# Patient Record
Sex: FEMALE | Race: WHITE | NOT HISPANIC OR LATINO | Employment: OTHER | URBAN - METROPOLITAN AREA
[De-identification: names, ages, dates, MRNs, and addresses within clinical notes are randomized per-mention and may not be internally consistent; named-entity substitution may affect disease eponyms.]

---

## 2017-06-06 ENCOUNTER — TELEPHONE (OUTPATIENT)
Dept: ENDOSCOPY | Facility: HOSPITAL | Age: 57
End: 2017-06-06

## 2017-06-06 ENCOUNTER — OFFICE VISIT (OUTPATIENT)
Dept: SURGERY | Facility: CLINIC | Age: 57
End: 2017-06-06
Payer: MEDICARE

## 2017-06-06 VITALS
SYSTOLIC BLOOD PRESSURE: 140 MMHG | DIASTOLIC BLOOD PRESSURE: 92 MMHG | BODY MASS INDEX: 33.19 KG/M2 | HEIGHT: 70 IN | WEIGHT: 231.81 LBS | TEMPERATURE: 98 F | HEART RATE: 98 BPM

## 2017-06-06 DIAGNOSIS — K31.84 GASTROPARESIS: Primary | ICD-10-CM

## 2017-06-06 PROCEDURE — 99024 POSTOP FOLLOW-UP VISIT: CPT | Mod: S$GLB,,, | Performed by: SURGERY

## 2017-06-06 PROCEDURE — 99999 PR PBB SHADOW E&M-EST. PATIENT-LVL III: CPT | Mod: PBBFAC,,, | Performed by: SURGERY

## 2017-06-06 RX ORDER — OXYCODONE AND ACETAMINOPHEN 10; 325 MG/1; MG/1
10-325 TABLET ORAL EVERY 4 HOURS PRN
COMMUNITY

## 2017-06-06 RX ORDER — TOPIRAMATE 25 MG/1
25 TABLET ORAL EVERY MORNING
COMMUNITY

## 2017-06-06 NOTE — LETTER
[facility] - General Surgery  [address]  [facility] [address]  Phone: [phone] June 6, 2017      Denise Leavitt MD  [address] [facility] [place] [address]    Patient: Jagruti Sands   MR Number: [id number]   YOB: 1960   Date of Visit: 6/6/2017     Dear Dr. Leavitt:    Thank you for referring Jagruti Sands to me for evaluation. Below are the relevant portions of my assessment and plan of care.    Patient presents with worsening gastroparesis symptoms, but battery seems ok. Adjusted today.     PLAN:  Will get CT result and ERCP report.  Obtain EGD.  Will follow up with Dr. Valenzuela for further adjustments.     If you have questions, please do not hesitate to call me. I look forward to following Jagruti along with you.    Sincerely,      Guillermo Esteves MD   Section Head - General, Laparoscopic, Bariatric  Acute Care and Oncologic Surgery   - Surgical Weight Loss Program  [facility]    WSRANDELL/chirag

## 2017-06-06 NOTE — PROGRESS NOTES
History & Physical    SUBJECTIVE:     History of Present Illness:  Patient is a 57 y.o. female presents with gastroparesis.  She has a gastric neurostimulator (she has had 6 procedure, sometimes battery replacement only) and had her last procedure here for that in 2015.  She was doing well until May when she was admitted for 10 days.  She has pain and nausea that is unremitting.  Usually when she has a flare of her symptoms they don't last that long.  She has 8/10 upper abdominal pain, nausea and vomiting that worsens with eating.  She has had an ercp for cbd stones and sphincterotomy in January.  She has not had a recent egd but has a ct from January.  She has zofran and phenergan but does poorly with these meds.      Chief Complaint   Patient presents with    Follow-up       Review of patient's allergies indicates:   Allergen Reactions    Amoxicillin Nausea And Vomiting    Bactrim [sulfamethoxazole-trimethoprim] Swelling     Patient states tongue swelling    Compazine [prochlorperazine] Anxiety    Hydrocodone Rash    Reglan [metoclopramide] Anaphylaxis    Adhesive tape-silicones Hives    Imitrex [sumatriptan] Palpitations    Adhesive     Aspirin      Causes bruising    Toradol [ketorolac] Rash       Current Outpatient Prescriptions   Medication Sig Dispense Refill    amitriptyline (ELAVIL) 150 MG Tab 100 mg nightly.       busPIRone (BUSPAR) 15 MG tablet 15 mg once. Take 2 pills once daily      butalbital-acetaminophen-caffeine -40 mg (FIORICET, ESGIC) -40 mg per tablet 2 tablets every 4 (four) hours as needed.       levothyroxine (SYNTHROID) 150 MCG tablet 150 mcg before breakfast.       liothyronine (CYTOMEL) 5 MCG Tab 5 mcg once daily.       lorazepam (ATIVAN) 1 MG tablet 1 mg every 8 (eight) hours as needed.       meclizine (ANTIVERT) 25 mg tablet 25 mg as needed.       oxycodone-acetaminophen (PERCOCET)  mg per tablet Take  tablets by mouth every 4 (four) hours as  needed for Pain.      pantoprazole (PROTONIX) 40 MG tablet 40 mg once daily.       tizanidine (ZANAFLEX) 4 MG tablet 4 mg. Take 8mg at night and PRN 3 times a day      topiramate (TOPAMAX) 25 MG tablet Take 25 mg by mouth every morning.      zolmitriptan (ZOMIG) 5 MG tablet 5 mg as needed.        No current facility-administered medications for this visit.        Past Medical History:   Diagnosis Date    Anemia     Anxiety     Depression     Fibromyalgia     Gastroparesis     GERD (gastroesophageal reflux disease)     Hemorrhoids     Hypothyroid     IBS (irritable bowel syndrome)     Migraine     PUD (peptic ulcer disease)      Past Surgical History:   Procedure Laterality Date    bladder lift N/A 04/2014    CHOLECYSTECTOMY      gastric neurostimulator      gastric pacemaker      HYSTERECTOMY       Family History   Problem Relation Age of Onset    Cancer Mother     Alcohol abuse Mother     Stroke Mother      Social History   Substance Use Topics    Smoking status: Never Smoker    Smokeless tobacco: Not on file    Alcohol use No        Review of Systems:  Review of Systems   Constitutional: Positive for unexpected weight change. Negative for fever.        Has lost weight on topomax and with nausea   Respiratory: Negative for cough, chest tightness and shortness of breath.         Is very weak due to this illness   Cardiovascular: Negative for chest pain.   Gastrointestinal: Negative for constipation and diarrhea.        Has bms several times a day but states that imaging shows she is fos   Genitourinary: Positive for dysuria and frequency. Negative for difficulty urinating.   Skin: Negative for rash and wound.   Neurological: Positive for headaches. Negative for seizures.        Has migraines 2-3 times a week   Hematological: Does not bruise/bleed easily.       OBJECTIVE:     Vital Signs (Most Recent)  Temp: 98.4 °F (36.9 °C) (06/06/17 0858)  Pulse: 98 (06/06/17 0858)  BP: (!) 140/92  "(06/06/17 0858)  5' 10" (1.778 m)  105.2 kg (231 lb 13 oz)     Physical Exam:  Physical Exam   Constitutional: She is oriented to person, place, and time. She appears well-developed and well-nourished.   Neck: Normal range of motion. Neck supple.   Cardiovascular: Normal rate, regular rhythm and normal heart sounds.    Pulmonary/Chest: Effort normal and breath sounds normal.   Abdominal: Soft. Bowel sounds are normal. She exhibits no distension.   Neurological: She is alert and oriented to person, place, and time.   Skin: Skin is warm and dry.        Psychiatric: She has a normal mood and affect. Her behavior is normal. Judgment and thought content normal.   Vitals reviewed.    Stimulator adjustment: imp 641 volt 5.1 curr 8 pw 330 rate 28 on 2 off 3 (from rate 14 on 1 off 4).  She gets shocking so we won't increase voltage.    Laboratory  None recent    Diagnostic Results:  None recent    ASSESSMENT/PLAN:     Worsening gastroparesis symptoms but battery seems ok.  Adjusted today.    PLAN:Plan     Will get ct result and ercp report.  Obtain egd.  Will follow up with Dr. Vlaenzuela for further adjustments.       "

## 2017-06-22 ENCOUNTER — TELEPHONE (OUTPATIENT)
Dept: ENDOSCOPY | Facility: HOSPITAL | Age: 57
End: 2017-06-22

## 2019-08-20 ENCOUNTER — TELEPHONE (OUTPATIENT)
Dept: SURGERY | Facility: CLINIC | Age: 59
End: 2019-08-20

## 2019-08-20 NOTE — TELEPHONE ENCOUNTER
----- Message from Fady Cheema sent at 8/19/2019  5:05 PM CDT -----  Contact: self  Pt would like a callback and would like a sooner appt than the 1 I caroline for her.              Pt callback number [phone]

## 2019-08-29 ENCOUNTER — OFFICE VISIT (OUTPATIENT)
Dept: SURGERY | Facility: CLINIC | Age: 59
End: 2019-08-29
Payer: MEDICARE

## 2019-08-29 VITALS
SYSTOLIC BLOOD PRESSURE: 130 MMHG | WEIGHT: 231 LBS | HEIGHT: 70 IN | DIASTOLIC BLOOD PRESSURE: 82 MMHG | BODY MASS INDEX: 33.07 KG/M2 | HEART RATE: 104 BPM

## 2019-08-29 DIAGNOSIS — K31.84 GASTROPARESIS: Primary | ICD-10-CM

## 2019-08-29 PROCEDURE — 99999 PR PBB SHADOW E&M-EST. PATIENT-LVL IV: CPT | Mod: PBBFAC,,, | Performed by: SURGERY

## 2019-08-29 PROCEDURE — 99214 PR OFFICE/OUTPT VISIT, EST, LEVL IV, 30-39 MIN: ICD-10-PCS | Mod: S$GLB,,, | Performed by: SURGERY

## 2019-08-29 PROCEDURE — 99999 PR PBB SHADOW E&M-EST. PATIENT-LVL IV: ICD-10-PCS | Mod: PBBFAC,,, | Performed by: SURGERY

## 2019-08-29 PROCEDURE — 3008F BODY MASS INDEX DOCD: CPT | Mod: CPTII,S$GLB,, | Performed by: SURGERY

## 2019-08-29 PROCEDURE — 99214 OFFICE O/P EST MOD 30 MIN: CPT | Mod: S$GLB,,, | Performed by: SURGERY

## 2019-08-29 PROCEDURE — 3008F PR BODY MASS INDEX (BMI) DOCUMENTED: ICD-10-PCS | Mod: CPTII,S$GLB,, | Performed by: SURGERY

## 2019-08-29 RX ORDER — MECLIZINE HYDROCHLORIDE 25 MG/1
25 TABLET ORAL
COMMUNITY

## 2019-08-29 RX ORDER — ESOMEPRAZOLE MAGNESIUM 40 MG/1
40 CAPSULE, DELAYED RELEASE ORAL
COMMUNITY

## 2019-08-29 RX ORDER — LORAZEPAM 1 MG/1
TABLET ORAL
COMMUNITY
Start: 2018-06-23

## 2019-08-29 RX ORDER — ISOPROPYL ALCOHOL 0.75 G/1
SWAB TOPICAL
COMMUNITY
Start: 2019-06-11

## 2019-08-29 RX ORDER — OXYCODONE AND ACETAMINOPHEN 10; 325 MG/1; MG/1
1 TABLET ORAL
COMMUNITY

## 2019-08-29 RX ORDER — CEFPODOXIME PROXETIL 100 MG/1
TABLET, FILM COATED ORAL
COMMUNITY
Start: 2019-07-28

## 2019-08-29 RX ORDER — BUTALBITAL, ACETAMINOPHEN, CAFFEINE AND CODEINE PHOSPHATE 50; 325; 40; 30 MG/1; MG/1; MG/1; MG/1
1 CAPSULE ORAL EVERY 4 HOURS PRN
COMMUNITY

## 2019-08-29 RX ORDER — LANOLIN ALCOHOL/MO/W.PET/CERES
100 CREAM (GRAM) TOPICAL
COMMUNITY

## 2019-08-29 RX ORDER — BUSPIRONE HYDROCHLORIDE 15 MG/1
30 TABLET ORAL
COMMUNITY

## 2019-08-29 RX ORDER — FLUCONAZOLE 150 MG/1
TABLET ORAL
COMMUNITY
Start: 2019-06-13

## 2019-08-29 RX ORDER — LIOTHYRONINE SODIUM 5 UG/1
TABLET ORAL
COMMUNITY
Start: 2018-06-26

## 2019-08-29 RX ORDER — CEFDINIR 300 MG/1
CAPSULE ORAL
COMMUNITY
Start: 2019-08-28

## 2019-08-29 RX ORDER — LEVOTHYROXINE SODIUM 50 UG/1
TABLET ORAL
COMMUNITY
Start: 2019-06-12

## 2019-08-29 RX ORDER — AMITRIPTYLINE HYDROCHLORIDE 100 MG/1
TABLET ORAL
COMMUNITY
Start: 2019-06-11

## 2019-08-29 RX ORDER — ERGOCALCIFEROL 1.25 MG/1
50000 CAPSULE ORAL
COMMUNITY

## 2019-08-29 RX ORDER — FOLIC ACID 1 MG/1
2 TABLET ORAL
COMMUNITY

## 2019-08-29 RX ORDER — TIZANIDINE 4 MG/1
8 TABLET ORAL
COMMUNITY

## 2019-08-29 RX ORDER — PANTOPRAZOLE SODIUM 40 MG/1
TABLET, DELAYED RELEASE ORAL
COMMUNITY
Start: 2018-06-23

## 2019-08-29 RX ORDER — DOXYCYCLINE HYCLATE 100 MG
TABLET ORAL
COMMUNITY
Start: 2019-06-26

## 2019-08-29 RX ORDER — LEVOTHYROXINE SODIUM 100 UG/1
TABLET ORAL
COMMUNITY
Start: 2019-08-29

## 2019-08-29 RX ORDER — LEVOTHYROXINE SODIUM 125 UG/1
150 TABLET ORAL
COMMUNITY

## 2019-08-29 NOTE — LETTER
[facility] - General Surgery  [address]  [facility] [address]  Phone: [phone] August 29, 2019      Denise Leavitt MD  [address] [facility] [place] [address]    Patient: Jagruti Lantigua   MR Number: [id number]   YOB: 1960   Date of Visit: 8/29/2019     Dear Dr. Leavitt:    Thank you for referring Jagruti Lantigua to me for evaluation. Attached you will find relevant portions of my assessment and plan of care.    Ms. Lantigua presents with a gastric neurostimulator (she has had 6 procedures, sometimes battery replacement only) and had her last procedure here for that in 2015.  She has shocking in her pocket and says it pokes out more than before. She has never had shocking before.  Also, her gastroparesis symptoms are worse but she has also recently been on a lot of different antibiotics for pneumonia and recurrent UTI.      Gastric stimulator adjustment: imp 611 voltage 4 Current 6.5 Pulse Width 360 Rate 40 Cycle on 3 Cycle off 2.      We will obtain EGD and for pouch revision with replacement of leads if they are broken.    If you have questions, please do not hesitate to call me. I look forward to following Jagruti Lantigua along with you.    Sincerely,      Guillermo Esteves MD  Professor, [employer]  Section Head, General Surgery  [facility]    WSR/afw    CC  Dawson Fontanez MD

## 2019-08-29 NOTE — PROGRESS NOTES
I have seen the patient, reviewed the Resident's history and physical, assessment and plan. I have personally interviewed and examined the patient at bedside and: agree with the findings.     She has a gastric neurostimulator (she has had 6 procedure, sometimes battery replacement only) and had her last procedure here for that in 2015.  She has shocking in her pocket and says it pokes out more than before.  She has never had shocking before.  Also her gastroparesis symptoms are worse but she has also recently been on a lot of different antibiotics for pneumonia and recurrent uti.  Gastric stimulator adjustment: imp 611 voltage 4 Current 6.5 Pulse Width 360 Rate 40 Cycle on 3 Cycle off 2 (was 4.1, 28,2,3) but she continued to have shocking and stimulator turned off..  Obtain egd and for pouch revision with replacement of leads if they are broken.

## 2019-08-29 NOTE — PROGRESS NOTES
History & Physical    SUBJECTIVE:     History of Present Illness:  Patient is a 59 y.o. female that has gastroparesis since 2003. History of 6 gastric stimulators. Last one in 2015 by Dr. Esteves. No one has checked the voltage or the status of the stimulator since the implantation of the device. Gastroparesis is being managed by outsider provider. Since 6 months ago she feels that the symptoms are getting worse, probably related to recurrent UTIs, pancreatitis, and neumonia during that time period. She has moderate vomit, extremely severe nausea, severe satiety, extremely severe bloating, postprandial fullness, epigastric pain, and epigastric burning. Currently taking omperazole one time daily, Prn Zofran (1 pill daily) and 10mg Percocet sporadically for headaches. Since 2-3 weeks ago she feels vibration, pain, and burning in the insertion site of the gastric stimulator. Now in clinic to check the settings of the device.     Chief Complaint   Patient presents with    Follow-up       Review of patient's allergies indicates:   Allergen Reactions    Amoxicillin Nausea And Vomiting    Bactrim [sulfamethoxazole-trimethoprim] Swelling     Patient states tongue swelling    Compazine [prochlorperazine] Anxiety    Hydrocodone Rash    Reglan [metoclopramide] Anaphylaxis    Adhesive tape-silicones Hives    Imitrex [sumatriptan] Palpitations    Adhesive     Zolmitriptan Other (See Comments)     bruises      Aspirin      Causes bruising    Toradol [ketorolac] Rash       Current Outpatient Medications   Medication Sig Dispense Refill    liothyronine (CYTOMEL) 5 MCG Tab       LORazepam (ATIVAN) 1 MG tablet       pantoprazole (PROTONIX) 40 MG tablet       amitriptyline (ELAVIL) 100 MG tablet       amitriptyline (ELAVIL) 150 MG Tab 100 mg nightly.       BD ALCOHOL SWABS PadM       busPIRone (BUSPAR) 15 MG tablet 15 mg once. Take 2 pills once daily      busPIRone (BUSPAR) 15 MG tablet Take 30 mg by mouth.       butalbital-acetaminop-caf-cod -93-30 mg Cap Take 1 capsule by mouth every 4 (four) hours as needed.      butalbital-acetaminophen-caffeine -40 mg (FIORICET, ESGIC) -40 mg per tablet 2 tablets every 4 (four) hours as needed.       cefdinir (OMNICEF) 300 MG capsule       cefpodoxime (VANTIN) 100 MG tablet       cyanocobalamin (VITAMIN B-12) 1000 MCG tablet Take 100 mcg by mouth.      doxycycline (VIBRA-TABS) 100 MG tablet       ergocalciferol (ERGOCALCIFEROL) 50,000 unit Cap Take 50,000 Units by mouth.      esomeprazole (NEXIUM) 40 MG capsule Take 40 mg by mouth.      fluconazole (DIFLUCAN) 150 MG Tab       folic acid (FOLVITE) 1 MG tablet Take 2 mg by mouth.      levothyroxine (SYNTHROID) 100 MCG tablet       levothyroxine (SYNTHROID) 125 MCG tablet Take 150 mcg by mouth.      levothyroxine (SYNTHROID) 150 MCG tablet 150 mcg before breakfast.       levothyroxine (SYNTHROID) 50 MCG tablet       liothyronine (CYTOMEL) 5 MCG Tab 5 mcg once daily.       lorazepam (ATIVAN) 1 MG tablet 1 mg every 8 (eight) hours as needed.       meclizine (ANTIVERT) 25 mg tablet 25 mg as needed.       meclizine (ANTIVERT) 25 mg tablet Take 25 mg by mouth.      oxycodone-acetaminophen (PERCOCET)  mg per tablet Take  tablets by mouth every 4 (four) hours as needed for Pain.      oxyCODONE-acetaminophen (PERCOCET)  mg per tablet Take 1 tablet by mouth.      pantoprazole (PROTONIX) 40 MG tablet 40 mg once daily.       tizanidine (ZANAFLEX) 4 MG tablet 4 mg. Take 8mg at night and PRN 3 times a day      tiZANidine (ZANAFLEX) 4 MG tablet Take 8 mg by mouth.      topiramate (TOPAMAX) 25 MG tablet Take 25 mg by mouth every morning.      zolmitriptan (ZOMIG) 5 MG tablet 5 mg as needed.        No current facility-administered medications for this visit.        Past Medical History:   Diagnosis Date    Anemia     Anxiety     Depression     Fibromyalgia     Gastroparesis     GERD  "(gastroesophageal reflux disease)     Hemorrhoids     Hypothyroid     IBS (irritable bowel syndrome)     Migraine     Pancreatitis 2018    Pneumonia 06/2019    PUD (peptic ulcer disease)      Past Surgical History:   Procedure Laterality Date    bladder lift N/A 04/2014    CHOLECYSTECTOMY      gastric neurostimulator      gastric pacemaker      HYSTERECTOMY      REPLACEMENT GASTRIC NEUROSTIMULATOR POSSIBLE LEAD REPLACEMENT N/A 4/27/2015    Performed by Guillermo Esteves MD at [facility] OR [address] [place] [address]     Family History   Problem Relation Age of Onset    Cancer Mother     Alcohol abuse Mother     Stroke Mother      Social History     Tobacco Use    Smoking status: Never Smoker   Substance Use Topics    Alcohol use: No    Drug use: No        Review of Systems:  Review of Systems   Constitutional: Positive for appetite change and diaphoresis.   HENT: Negative.  Negative for congestion and drooling.    Eyes: Negative.  Negative for pain and discharge.   Respiratory: Negative.  Negative for apnea and chest tightness.    Cardiovascular: Negative.  Negative for chest pain and leg swelling.   Gastrointestinal: Positive for abdominal distention and abdominal pain.   Endocrine: Negative for cold intolerance and heat intolerance.   Genitourinary: Negative.  Negative for difficulty urinating and dysuria.   Musculoskeletal: Negative.  Negative for arthralgias and back pain.   Allergic/Immunologic: Negative.  Negative for environmental allergies and food allergies.   Neurological: Positive for headaches. Negative for dizziness.       OBJECTIVE:     Vital Signs (Most Recent)  Pulse: 104 (08/29/19 1745)  BP: 130/82 (08/29/19 1745)  5' 10" (1.778 m)  104.8 kg (231 lb)     Physical Exam:  Physical Exam   Constitutional: She is oriented to person, place, and time. She appears well-developed and well-nourished.   HENT:   Head: Normocephalic and atraumatic.   Eyes: Pupils are equal, round, and reactive to light. " Conjunctivae are normal.   Neck: Normal range of motion. Neck supple.   Cardiovascular: Normal rate, regular rhythm and normal heart sounds.   Pulmonary/Chest: Effort normal and breath sounds normal.   Abdominal: Soft. Bowel sounds are normal. She exhibits no distension and no mass. There is no tenderness. There is no guarding.   Musculoskeletal: Normal range of motion. She exhibits no edema or deformity.   Neurological: She is alert and oriented to person, place, and time. No cranial nerve deficit.   Skin: Skin is warm.         ERCP:   Done for pancreatitis 6 months ago, showed undigested food in the stomach.     ASSESSMENT/PLAN:     59 y.o female with chronic history of gastroparesis  S/p gastric stimulator placement x6 last one 2015  Now complaining of worsening symptoms and shock pain    PLAN:  - gastric stimulator settings were lower down, however patient could still feal shocking pain so the device was turned off  - Will plan for a pocket revision   - if leads are broken we will replace them  - Plan was discussed with the patient and daughter     Talat Hodges MD   General Surgery PGY-I  Pager [phone]

## 2019-08-29 NOTE — H&P (VIEW-ONLY)
History & Physical    SUBJECTIVE:     History of Present Illness:  Patient is a 59 y.o. female that has gastroparesis since 2003. History of 6 gastric stimulators. Last one in 2015 by Dr. Esteves. No one has checked the voltage or the status of the stimulator since the implantation of the device. Gastroparesis is being managed by outsider provider. Since 6 months ago she feels that the symptoms are getting worse, probably related to recurrent UTIs, pancreatitis, and neumonia during that time period. She has moderate vomit, extremely severe nausea, severe satiety, extremely severe bloating, postprandial fullness, epigastric pain, and epigastric burning. Currently taking omperazole one time daily, Prn Zofran (1 pill daily) and 10mg Percocet sporadically for headaches. Since 2-3 weeks ago she feels vibration, pain, and burning in the insertion site of the gastric stimulator. Now in clinic to check the settings of the device.     Chief Complaint   Patient presents with    Follow-up       Review of patient's allergies indicates:   Allergen Reactions    Amoxicillin Nausea And Vomiting    Bactrim [sulfamethoxazole-trimethoprim] Swelling     Patient states tongue swelling    Compazine [prochlorperazine] Anxiety    Hydrocodone Rash    Reglan [metoclopramide] Anaphylaxis    Adhesive tape-silicones Hives    Imitrex [sumatriptan] Palpitations    Adhesive     Zolmitriptan Other (See Comments)     bruises      Aspirin      Causes bruising    Toradol [ketorolac] Rash       Current Outpatient Medications   Medication Sig Dispense Refill    liothyronine (CYTOMEL) 5 MCG Tab       LORazepam (ATIVAN) 1 MG tablet       pantoprazole (PROTONIX) 40 MG tablet       amitriptyline (ELAVIL) 100 MG tablet       amitriptyline (ELAVIL) 150 MG Tab 100 mg nightly.       BD ALCOHOL SWABS PadM       busPIRone (BUSPAR) 15 MG tablet 15 mg once. Take 2 pills once daily      busPIRone (BUSPAR) 15 MG tablet Take 30 mg by mouth.       butalbital-acetaminop-caf-cod -13-30 mg Cap Take 1 capsule by mouth every 4 (four) hours as needed.      butalbital-acetaminophen-caffeine -40 mg (FIORICET, ESGIC) -40 mg per tablet 2 tablets every 4 (four) hours as needed.       cefdinir (OMNICEF) 300 MG capsule       cefpodoxime (VANTIN) 100 MG tablet       cyanocobalamin (VITAMIN B-12) 1000 MCG tablet Take 100 mcg by mouth.      doxycycline (VIBRA-TABS) 100 MG tablet       ergocalciferol (ERGOCALCIFEROL) 50,000 unit Cap Take 50,000 Units by mouth.      esomeprazole (NEXIUM) 40 MG capsule Take 40 mg by mouth.      fluconazole (DIFLUCAN) 150 MG Tab       folic acid (FOLVITE) 1 MG tablet Take 2 mg by mouth.      levothyroxine (SYNTHROID) 100 MCG tablet       levothyroxine (SYNTHROID) 125 MCG tablet Take 150 mcg by mouth.      levothyroxine (SYNTHROID) 150 MCG tablet 150 mcg before breakfast.       levothyroxine (SYNTHROID) 50 MCG tablet       liothyronine (CYTOMEL) 5 MCG Tab 5 mcg once daily.       lorazepam (ATIVAN) 1 MG tablet 1 mg every 8 (eight) hours as needed.       meclizine (ANTIVERT) 25 mg tablet 25 mg as needed.       meclizine (ANTIVERT) 25 mg tablet Take 25 mg by mouth.      oxycodone-acetaminophen (PERCOCET)  mg per tablet Take  tablets by mouth every 4 (four) hours as needed for Pain.      oxyCODONE-acetaminophen (PERCOCET)  mg per tablet Take 1 tablet by mouth.      pantoprazole (PROTONIX) 40 MG tablet 40 mg once daily.       tizanidine (ZANAFLEX) 4 MG tablet 4 mg. Take 8mg at night and PRN 3 times a day      tiZANidine (ZANAFLEX) 4 MG tablet Take 8 mg by mouth.      topiramate (TOPAMAX) 25 MG tablet Take 25 mg by mouth every morning.      zolmitriptan (ZOMIG) 5 MG tablet 5 mg as needed.        No current facility-administered medications for this visit.        Past Medical History:   Diagnosis Date    Anemia     Anxiety     Depression     Fibromyalgia     Gastroparesis     GERD  "(gastroesophageal reflux disease)     Hemorrhoids     Hypothyroid     IBS (irritable bowel syndrome)     Migraine     Pancreatitis 2018    Pneumonia 06/2019    PUD (peptic ulcer disease)      Past Surgical History:   Procedure Laterality Date    bladder lift N/A 04/2014    CHOLECYSTECTOMY      gastric neurostimulator      gastric pacemaker      HYSTERECTOMY      REPLACEMENT GASTRIC NEUROSTIMULATOR POSSIBLE LEAD REPLACEMENT N/A 4/27/2015    Performed by Guillermo Esteves MD at [facility] OR [address] [place] [address]     Family History   Problem Relation Age of Onset    Cancer Mother     Alcohol abuse Mother     Stroke Mother      Social History     Tobacco Use    Smoking status: Never Smoker   Substance Use Topics    Alcohol use: No    Drug use: No        Review of Systems:  Review of Systems   Constitutional: Positive for appetite change and diaphoresis.   HENT: Negative.  Negative for congestion and drooling.    Eyes: Negative.  Negative for pain and discharge.   Respiratory: Negative.  Negative for apnea and chest tightness.    Cardiovascular: Negative.  Negative for chest pain and leg swelling.   Gastrointestinal: Positive for abdominal distention and abdominal pain.   Endocrine: Negative for cold intolerance and heat intolerance.   Genitourinary: Negative.  Negative for difficulty urinating and dysuria.   Musculoskeletal: Negative.  Negative for arthralgias and back pain.   Allergic/Immunologic: Negative.  Negative for environmental allergies and food allergies.   Neurological: Positive for headaches. Negative for dizziness.       OBJECTIVE:     Vital Signs (Most Recent)  Pulse: 104 (08/29/19 1745)  BP: 130/82 (08/29/19 1745)  5' 10" (1.778 m)  104.8 kg (231 lb)     Physical Exam:  Physical Exam   Constitutional: She is oriented to person, place, and time. She appears well-developed and well-nourished.   HENT:   Head: Normocephalic and atraumatic.   Eyes: Pupils are equal, round, and reactive to light. " Conjunctivae are normal.   Neck: Normal range of motion. Neck supple.   Cardiovascular: Normal rate, regular rhythm and normal heart sounds.   Pulmonary/Chest: Effort normal and breath sounds normal.   Abdominal: Soft. Bowel sounds are normal. She exhibits no distension and no mass. There is no tenderness. There is no guarding.   Musculoskeletal: Normal range of motion. She exhibits no edema or deformity.   Neurological: She is alert and oriented to person, place, and time. No cranial nerve deficit.   Skin: Skin is warm.         ERCP:   Done for pancreatitis 6 months ago, showed undigested food in the stomach.     ASSESSMENT/PLAN:     59 y.o female with chronic history of gastroparesis  S/p gastric stimulator placement x6 last one 2015  Now complaining of worsening symptoms and shock pain    PLAN:  - gastric stimulator settings were lower down, however patient could still feal shocking pain so the device was turned off  - Will plan for a pocket revision   - if leads are broken we will replace them  - Plan was discussed with the patient and daughter     Talat Hodges MD   General Surgery PGY-I  Pager [phone]

## 2019-09-18 ENCOUNTER — ANESTHESIA (OUTPATIENT)
Dept: ENDOSCOPY | Facility: HOSPITAL | Age: 59
End: 2019-09-18
Payer: MEDICARE

## 2019-09-18 ENCOUNTER — HOSPITAL ENCOUNTER (OUTPATIENT)
Facility: HOSPITAL | Age: 59
Discharge: HOME | End: 2019-09-18
Attending: INTERNAL MEDICINE | Admitting: INTERNAL MEDICINE
Payer: MEDICARE

## 2019-09-18 ENCOUNTER — ANESTHESIA EVENT (OUTPATIENT)
Dept: ENDOSCOPY | Facility: HOSPITAL | Age: 59
End: 2019-09-18
Payer: MEDICARE

## 2019-09-18 VITALS
SYSTOLIC BLOOD PRESSURE: 119 MMHG | BODY MASS INDEX: 32.21 KG/M2 | OXYGEN SATURATION: 98 % | WEIGHT: 225 LBS | RESPIRATION RATE: 20 BRPM | HEIGHT: 70 IN | DIASTOLIC BLOOD PRESSURE: 55 MMHG | TEMPERATURE: 98 F | HEART RATE: 83 BPM

## 2019-09-18 DIAGNOSIS — K31.84 GASTROPARESIS: Primary | ICD-10-CM

## 2019-09-18 PROCEDURE — 43239 PR EGD, FLEX, W/BIOPSY, SGL/MULTI: ICD-10-PCS | Mod: ,,, | Performed by: INTERNAL MEDICINE

## 2019-09-18 PROCEDURE — 37000008 HC ANESTHESIA 1ST 15 MINUTES: Performed by: INTERNAL MEDICINE

## 2019-09-18 PROCEDURE — 27201012 HC FORCEPS, HOT/COLD, DISP: Performed by: INTERNAL MEDICINE

## 2019-09-18 PROCEDURE — 43239 EGD BIOPSY SINGLE/MULTIPLE: CPT | Performed by: INTERNAL MEDICINE

## 2019-09-18 PROCEDURE — E9220 PRA ENDO ANESTHESIA: HCPCS | Mod: ,,, | Performed by: NURSE ANESTHETIST, CERTIFIED REGISTERED

## 2019-09-18 PROCEDURE — 37000009 HC ANESTHESIA EA ADD 15 MINS: Performed by: INTERNAL MEDICINE

## 2019-09-18 PROCEDURE — 63600175 PHARM REV CODE 636 W HCPCS: Performed by: ANESTHESIOLOGY

## 2019-09-18 PROCEDURE — E9220 PRA ENDO ANESTHESIA: ICD-10-PCS | Mod: ,,, | Performed by: NURSE ANESTHETIST, CERTIFIED REGISTERED

## 2019-09-18 PROCEDURE — 88305 TISSUE SPECIMEN TO PATHOLOGY - SURGERY: ICD-10-PCS | Mod: 26,,, | Performed by: PATHOLOGY

## 2019-09-18 PROCEDURE — 88305 TISSUE EXAM BY PATHOLOGIST: CPT | Mod: 26,,, | Performed by: PATHOLOGY

## 2019-09-18 PROCEDURE — 63600175 PHARM REV CODE 636 W HCPCS: Performed by: NURSE ANESTHETIST, CERTIFIED REGISTERED

## 2019-09-18 PROCEDURE — 43239 EGD BIOPSY SINGLE/MULTIPLE: CPT | Mod: ,,, | Performed by: INTERNAL MEDICINE

## 2019-09-18 PROCEDURE — 88305 TISSUE EXAM BY PATHOLOGIST: CPT | Performed by: PATHOLOGY

## 2019-09-18 PROCEDURE — 63600175 PHARM REV CODE 636 W HCPCS: Performed by: INTERNAL MEDICINE

## 2019-09-18 RX ORDER — ONDANSETRON 2 MG/ML
4 INJECTION INTRAMUSCULAR; INTRAVENOUS ONCE
Status: COMPLETED | OUTPATIENT
Start: 2019-09-18 | End: 2019-09-18

## 2019-09-18 RX ORDER — FENTANYL CITRATE 50 UG/ML
25 INJECTION, SOLUTION INTRAMUSCULAR; INTRAVENOUS EVERY 5 MIN PRN
Status: COMPLETED | OUTPATIENT
Start: 2019-09-18 | End: 2019-09-18

## 2019-09-18 RX ORDER — HEPARIN 100 UNIT/ML
5 SYRINGE INTRAVENOUS ONCE
Status: COMPLETED | OUTPATIENT
Start: 2019-09-18 | End: 2019-09-18

## 2019-09-18 RX ORDER — SODIUM CHLORIDE 0.9 % (FLUSH) 0.9 %
10 SYRINGE (ML) INJECTION
Status: DISCONTINUED | OUTPATIENT
Start: 2019-09-18 | End: 2019-09-18 | Stop reason: HOSPADM

## 2019-09-18 RX ORDER — PROPOFOL 10 MG/ML
VIAL (ML) INTRAVENOUS
Status: DISCONTINUED | OUTPATIENT
Start: 2019-09-18 | End: 2019-09-18

## 2019-09-18 RX ORDER — SODIUM CHLORIDE 9 MG/ML
INJECTION, SOLUTION INTRAVENOUS CONTINUOUS
Status: DISCONTINUED | OUTPATIENT
Start: 2019-09-18 | End: 2019-09-18 | Stop reason: HOSPADM

## 2019-09-18 RX ORDER — LIDOCAINE HCL/PF 100 MG/5ML
SYRINGE (ML) INTRAVENOUS
Status: DISCONTINUED | OUTPATIENT
Start: 2019-09-18 | End: 2019-09-18

## 2019-09-18 RX ADMIN — SODIUM CHLORIDE: 0.9 INJECTION, SOLUTION INTRAVENOUS at 08:09

## 2019-09-18 RX ADMIN — FENTANYL CITRATE 25 MCG: 50 INJECTION INTRAMUSCULAR; INTRAVENOUS at 09:09

## 2019-09-18 RX ADMIN — LIDOCAINE HYDROCHLORIDE 100 MG: 20 INJECTION, SOLUTION INTRAVENOUS at 09:09

## 2019-09-18 RX ADMIN — PROPOFOL 150 MG: 10 INJECTION, EMULSION INTRAVENOUS at 09:09

## 2019-09-18 RX ADMIN — PROPOFOL 50 MG: 10 INJECTION, EMULSION INTRAVENOUS at 09:09

## 2019-09-18 RX ADMIN — FENTANYL CITRATE 25 MCG: 50 INJECTION INTRAMUSCULAR; INTRAVENOUS at 10:09

## 2019-09-18 RX ADMIN — HEPARIN 500 UNITS: 100 SYRINGE at 10:09

## 2019-09-18 RX ADMIN — ONDANSETRON 4 MG: 2 INJECTION INTRAMUSCULAR; INTRAVENOUS at 09:09

## 2019-09-18 NOTE — INTERVAL H&P NOTE
Pre-Procedure H and P Addendum    Patient seen and examined.  History and exam unchanged from prior history and physical.      Procedure: EGD  Indication: Gastroparesis  ASA Class: per anesthesiology  Airway: normal  Neck Mobility: full range of motion  Mallampatti score: per anesthesia  History of anesthesia problems: no  Family history of anesthesia problems: no  Anesthesia Plan: MAC    Anesthesia/Surgery risks, benefits and alternative options discussed and understood by patient/family.          Active Hospital Problems    Diagnosis  POA    Gastroparesis [K31.84]  Yes      Resolved Hospital Problems   No resolved problems to display.

## 2019-09-18 NOTE — ANESTHESIA PREPROCEDURE EVALUATION
09/18/2019  Jagruti Lantigua is a 59 y.o., female.  Past Medical History:   Diagnosis Date    Anemia     Anxiety     Depression     Fibromyalgia     Gastroparesis     GERD (gastroesophageal reflux disease)     Hemorrhoids     Hypothyroid     IBS (irritable bowel syndrome)     Migraine     Pancreatitis 2018    Pneumonia 06/2019    PUD (peptic ulcer disease)      Past Surgical History:   Procedure Laterality Date    bladder lift N/A 04/2014    CHOLECYSTECTOMY      gastric neurostimulator      gastric pacemaker      HYSTERECTOMY      REPLACEMENT GASTRIC NEUROSTIMULATOR POSSIBLE LEAD REPLACEMENT N/A 4/27/2015    Performed by Guillermo Esteves MD at [facility] OR [address] [place] [address]         Anesthesia Evaluation    I have reviewed the Patient Summary Reports.     I have reviewed the Medications.     Review of Systems  Anesthesia Hx:  No problems with previous Anesthesia    Social:  Non-Smoker, No Alcohol Use    Hematology/Oncology:  Hematology Normal   Oncology Normal     EENT/Dental:EENT/Dental Normal   Cardiovascular:  Cardiovascular Normal     Pulmonary:  Pulmonary Normal    Renal/:  Renal/ Normal     Hepatic/GI:   GERD, poorly controlled    Musculoskeletal:  Musculoskeletal Normal    Endocrine:  Endocrine Normal    Dermatological:  Skin Normal    Psych:  Psychiatric Normal           Physical Exam  General:  Obesity    Airway/Jaw/Neck:  Airway Findings: Mouth Opening: Small, but > 3cm Tongue: Normal  General Airway Assessment: Adult  Mallampati: II  TM Distance: Normal, at least 6 cm        Eyes/Ears/Nose:  EYES/EARS/NOSE FINDINGS: Normal    Chest/Lungs:  Chest/Lungs Clear    Heart/Vascular:  Heart Findings: Rate: Normal  Rhythm: Regular Rhythm  Sounds: Normal  Heart murmur: negative Vascular Findings: Normal    Abdomen:  Abdomen Findings: Normal    Musculoskeletal:  Musculoskeletal Findings: Normal    Skin:  Skin Findings: Normal    Mental Status:  Mental Status Findings:  Alert and Oriented, Cooperative         Anesthesia Plan  Type of Anesthesia, risks & benefits discussed:  Anesthesia Type:  general  Patient's Preference: general  Intra-op Monitoring Plan: standard ASA monitors  Intra-op Monitoring Plan Comments:   Post Op Pain Control Plan:   Post Op Pain Control Plan Comments:   Induction:   IV  Beta Blocker:  Patient is not currently on a Beta-Blocker (No further documentation required).       Informed Consent: Patient understands risks and agrees with Anesthesia plan.  Questions answered. Anesthesia consent signed with patient.  ASA Score: 3     Day of Surgery Review of History & Physical: I have interviewed and examined the patient. I have reviewed the patient's H&P dated:  There are no significant changes.  H&P update referred to the surgeon.         Ready For Surgery From Anesthesia Perspective.

## 2019-09-18 NOTE — OR NURSING
Dr. Dawson notified of patient's pain still being 7/10 after receiving 100mcg of fentanyl. MD stated okay for patient to be discharged.

## 2019-09-18 NOTE — TRANSFER OF CARE
"Anesthesia Transfer of Care Note    Patient: Jagruti Lantigua    Procedure(s) Performed: Procedure(s) (LRB):  EGD (ESOPHAGOGASTRODUODENOSCOPY) (N/A)    Patient location: PACU    Anesthesia Type: general    Transport from OR: Transported from OR on room air with adequate spontaneous ventilation    Post pain: adequate analgesia    Post assessment: no apparent anesthetic complications    Post vital signs: stable    Level of consciousness: awake    Nausea/Vomiting: no nausea/vomiting    Complications: none    Transfer of care protocol was followed      Last vitals:   Visit Vitals  /62 (BP Location: Left arm, Patient Position: Lying)   Pulse 88   Temp 36.4 °C (97.5 °F)   Resp 16   Ht 5' 10" (1.778 m)   Wt 102.1 kg (225 lb)   SpO2 (!) 93%   Breastfeeding? No   BMI 32.28 kg/m²     "

## 2019-09-18 NOTE — [employer] PATIENT INSTRUCTIONS
Discharge Summary/Instructions after an Endoscopic Procedure  Patient Name: Jagruti Lantigua  Patient MRN: [id number]  Patient YOB: 1960  Wednesday, September 18, 2019  Ke Zuniga MD  RESTRICTIONS:  During your procedure today, you received medications for sedation.  These   medications may affect your judgment, balance and coordination.  Therefore,   for 24 hours, you have the following restrictions:   - DO NOT drive a car, operate machinery, make legal/financial decisions,   sign important papers or drink alcohol.    ACTIVITY:  Today: no heavy lifting, straining or running due to procedural   sedation/anesthesia.  The following day: return to full activity including work.  DIET:  Eat and drink normally unless instructed otherwise.     TREATMENT FOR COMMON SIDE EFFECTS:  - Mild abdominal pain, nausea, belching, bloating or excessive gas:  rest,   eat lightly and use a heating pad.  - Sore Throat: treat with throat lozenges and/or gargle with warm salt   water.  - Because air was used during the procedure, expelling large amounts of air   from your rectum or belching is normal.  - If a bowel prep was taken, you may not have a bowel movement for 1-3 days.    This is normal.  SYMPTOMS TO WATCH FOR AND REPORT TO YOUR PHYSICIAN:  1. Abdominal pain or bloating, other than gas cramps.  2. Chest pain.  3. Back pain.  4. Signs of infection such as: chills or fever occurring within 24 hours   after the procedure.  5. Rectal bleeding, which would show as bright red, maroon, or black stools.   (A tablespoon of blood from the rectum is not serious, especially if   hemorrhoids are present.)  6. Vomiting.  7. Weakness or dizziness.  GO DIRECTLY TO THE NEAREST EMERGENCY ROOM IF YOU HAVE ANY OF THE FOLLOWING:      Difficulty breathing              Chills and/or fever over 101 F   Persistent vomiting and/or vomiting blood   Severe abdominal pain   Severe chest pain   Black, tarry stools   Bleeding- more than one  tablespoon   Any other symptom or condition that you feel may need urgent attention  Your doctor recommends these additional instructions:  If any biopsies were taken, your doctors clinic will contact you in 1 to 2   weeks with any results.  - Discharge patient to home.   - Patient has a contact number available for emergencies.  The signs and   symptoms of potential delayed complications were discussed with the   patient.  Return to normal activities tomorrow.  Written discharge   instructions were provided to the patient.   - Resume previous diet.   - Continue present medications.   - Await pathology results.   - Telephone GI clinic for pathology results in 1 week.   - Return to referring physician.  For questions, problems or results please call your physician - Ke Zuniga MD at Work:  [phone].  [facility], EMERGENCY ROOM PHONE NUMBER: [phone]  IF A COMPLICATION OR EMERGENCY SITUATION ARISES AND YOU ARE UNABLE TO REACH   YOUR PHYSICIAN - GO DIRECTLY TO THE EMERGENCY ROOM.  Ke Zuniga MD  9/18/2019 9:26:22 AM  This report has been verified and signed electronically.  [employer]

## 2019-09-18 NOTE — ANESTHESIA POSTPROCEDURE EVALUATION
Anesthesia Post Evaluation    Patient: Jagruti Lantigua    Procedure(s) Performed: Procedure(s) (LRB):  EGD (ESOPHAGOGASTRODUODENOSCOPY) (N/A)    Final Anesthesia Type: general  Patient location during evaluation: GI PACU  Patient participation: Yes- Able to Participate  Level of consciousness: awake and alert, awake and oriented  Post-procedure vital signs: reviewed and stable  Pain management: adequate  Airway patency: patent  PONV status at discharge: No PONV  Anesthetic complications: no      Cardiovascular status: blood pressure returned to baseline, stable and hemodynamically stable  Respiratory status: unassisted, spontaneous ventilation and room air  Hydration status: euvolemic  Follow-up not needed.          Vitals Value Taken Time   /55 9/18/2019 10:31 AM   Temp 36.5 °C (97.7 °F) 9/18/2019  9:28 AM   Pulse 83 9/18/2019 10:31 AM   Resp 20 9/18/2019 10:31 AM   SpO2 98 % 9/18/2019 10:31 AM         Event Time     Out of Recovery 10:43:29          Pain/Ken Score: Pain Rating Prior to Med Admin: 8 (9/18/2019 10:09 AM)  Ken Score: 10 (9/18/2019  9:28 AM)

## 2019-09-25 ENCOUNTER — TELEPHONE (OUTPATIENT)
Dept: ENDOSCOPY | Facility: HOSPITAL | Age: 59
End: 2019-09-25

## 2020-05-25 ENCOUNTER — TELEPHONE (OUTPATIENT)
Dept: SURGERY | Facility: CLINIC | Age: 60
End: 2020-05-25

## 2020-05-25 NOTE — TELEPHONE ENCOUNTER
Patient is ready to move forward with pocket revision of stimulator with possible lead replacement. Wants to know if she'll need to be seen prior to surgery .

## 2020-05-27 ENCOUNTER — DOCUMENTATION ONLY (OUTPATIENT)
Dept: SURGERY | Facility: CLINIC | Age: 60
End: 2020-05-27

## 2020-05-27 ENCOUNTER — TELEPHONE (OUTPATIENT)
Dept: SURGERY | Facility: CLINIC | Age: 60
End: 2020-05-27

## 2020-05-27 NOTE — TELEPHONE ENCOUNTER
Patient will send outside records from [facility] in [place]. I will also send PCP work uo to her primary physician in [place]. Pt v/u

## 2020-06-05 ENCOUNTER — TELEPHONE (OUTPATIENT)
Dept: SURGERY | Facility: CLINIC | Age: 60
End: 2020-06-05

## 2020-06-05 NOTE — PROGRESS NOTES
Phone call: She says she has to take narcotics for her migraines and says there are no other medications that work (by  it appears she is getting 20 a month).  She says that an antibiotic she took for a uti has made them worse.  She says she has tried many medications for migraines and they have given her side effects.  I suggest she re-consult neurology for more help but she doesn't think that will help.  I would like her to be off of narcotics for 1 month prior to any surgery and we should check when she comes in for preop.

## 2020-06-05 NOTE — TELEPHONE ENCOUNTER
Spoke to Miss Chand regarding 's plan moving forward. Explained that he doesn't think the symptoms will be resolved until she's is able to stop her narcotic medication.

## 2022-10-27 NOTE — PROGRESS NOTES
Epic won't let me close without making a note.  This was done in error.   Cyclophosphamide Pregnancy And Lactation Text: This medication is Pregnancy Category D and it isn't considered safe during pregnancy. This medication is excreted in breast milk.